# Patient Record
Sex: FEMALE | Race: WHITE | NOT HISPANIC OR LATINO | Employment: PART TIME | URBAN - METROPOLITAN AREA
[De-identification: names, ages, dates, MRNs, and addresses within clinical notes are randomized per-mention and may not be internally consistent; named-entity substitution may affect disease eponyms.]

---
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
## 2024-01-12 ENCOUNTER — OFFICE VISIT (OUTPATIENT)
Dept: URGENT CARE | Facility: CLINIC | Age: 19
End: 2024-01-12
Payer: COMMERCIAL

## 2024-01-12 VITALS
RESPIRATION RATE: 16 BRPM | SYSTOLIC BLOOD PRESSURE: 108 MMHG | TEMPERATURE: 97 F | BODY MASS INDEX: 23.04 KG/M2 | WEIGHT: 130 LBS | HEIGHT: 63 IN | DIASTOLIC BLOOD PRESSURE: 75 MMHG | OXYGEN SATURATION: 100 % | HEART RATE: 80 BPM

## 2024-01-12 DIAGNOSIS — L23.2 ALLERGIC CONTACT DERMATITIS DUE TO COSMETICS: Primary | ICD-10-CM

## 2024-01-12 PROCEDURE — 99213 OFFICE O/P EST LOW 20 MIN: CPT

## 2024-01-12 RX ORDER — DOXYCYCLINE HYCLATE 60 MG/1
TABLET, DELAYED RELEASE ORAL
COMMUNITY
Start: 2023-10-25

## 2024-01-12 RX ORDER — AMOXICILLIN 500 MG/1
500 CAPSULE ORAL 3 TIMES DAILY
COMMUNITY
Start: 2024-01-03

## 2024-01-12 NOTE — PROGRESS NOTES
[facility]        NAME: Maria Teresa Thompson is a 18 y.o. female  : 2005    MRN: [id number]  DATE: 2024  TIME: 8:30 AM    Assessment and Plan   Allergic contact dermatitis due to cosmetics [L23.2]  1. Allergic contact dermatitis due to cosmetics          Symptoms resolved at present time but patient showed pictures on cell phone and noted erythematous papules with signs of excoriation and likely related to new body wash as taking Amoxicillin > 1 week. Discontinue. Encouraged continued supportive measures.  Follow up with PCP in 3-5 days or proceed to emergency department for worsening symptoms.  Patient verbalized understanding of instructions given.       Patient Instructions     Patient Instructions   Discontinue using body wash  OTC Benadryl as needed for itching  Topical hydrocortisone cream   Follow up with PCP in 3-5 days.  Proceed to  ER if symptoms worsen.    Contact Dermatitis   AMBULATORY CARE:   Contact dermatitis  is a rash. It develops when you touch something that irritates your skin or causes an allergic reaction.  Common signs and symptoms include the following:   Red, swollen, painful rash    Skin that itches, stings, or burns    Dry, scaly, or crusty skin patches    Bumps or blisters    Fluid draining from blisters    Call your local emergency number (911 in the [place]) if:   You have sudden trouble breathing.    Your throat swells and you have trouble eating.    Your face is swollen.    Call your doctor or dermatologist if:   You have a fever.    Your blisters are draining pus.    Your rash spreads or does not get better, even after treatment.    You have questions or concerns about your condition or care.    Treatment for contact dermatitis  involves removing any irritants or allergens that cause your rash. You may also need medicines to decrease itching and swelling. They will be given as a topical medicine to apply to your rash or as a pill.  Manage contact dermatitis:    Take short baths or showers in cool water.  Use mild soap or soap-free cleansers. Add oatmeal, baking soda, or cornstarch to the bath water to help decrease skin irritation.    Avoid skin irritants.  Examples include makeup, hair products, soaps, and cleansers. Use products that do not contain a scent or dye.    Apply a cool compress to your rash.  This will help soothe your skin.    Apply lotions or creams to the area.  These help keep your skin moist and decrease itching. Apply the lotion or cream right after a lukewarm bath or shower when your skin is still damp. Use products that do not contain a scent.    Follow up with your doctor or dermatologist in 2 to 3 days:  Write down your questions so you remember to ask them during your visits.  © Copyright [employer] 2023 Information is for End User's use only and may not be sold, redistributed or otherwise used for commercial purposes.  The above information is an  only. It is not intended as medical advice for individual conditions or treatments. Talk to your doctor, nurse or pharmacist before following any medical regimen to see if it is safe and effective for you.        Chief Complaint     Chief Complaint   Patient presents with    Rash     Itchiness all over body with rash that has since faded Has been on Amoxicillin since Wednesday since she had wisdom teeth removed         History of Present Illness       18-year-old female with no significant past medical history presents with complaints of rash and pruritus x 2 days.  Patient reports using new body wash yesterday and developing rash to torso, upper extremities, and lower extremities.  She reports itching to full body including scalp and did take a dose of OTC Benadryl lotion of rash.  No shortness of breath, wheezing, fever, or URI symptoms. Patient reports currently on Amoxicillin BID for wisdom tooth extraction but taking for 9 days and no prior history of allergic reaction.         Review  "of Systems   Review of Systems   Constitutional:  Negative for chills and fever.   HENT:  Negative for congestion, ear discharge, ear pain, rhinorrhea, sore throat, trouble swallowing and voice change.    Eyes:  Negative for discharge.   Respiratory:  Negative for cough, shortness of breath and wheezing.    Cardiovascular:  Negative for chest pain.   Gastrointestinal:  Negative for abdominal pain, diarrhea, nausea and vomiting.   Musculoskeletal:  Negative for myalgias.   Skin:  Positive for rash.         Current Medications       Current Outpatient Medications:     amoxicillin (AMOXIL) 500 mg capsule, Take 500 mg by mouth 3 (three) times a day, Disp: , Rfl:     Doryx MPC 60 MG TBEC, Take one pill twice daily, Disp: , Rfl:     albuterol (ProAir HFA) 90 mcg/act inhaler, Inhale 2 puffs every 6 (six) hours as needed for wheezing, Disp: 8.5 g, Rfl: 0    triamcinolone (KENALOG) 0.1 % ointment, Apply topically 2 (two) times a day (Patient not taking: Reported on 9/19/2023), Disp: 30 g, Rfl: 1    Current Allergies     Allergies as of 01/12/2024    (No Known Allergies)            The following portions of the patient's history were reviewed and updated as appropriate: allergies, current medications, past family history, past medical history, past social history, past surgical history and problem list.     Past Medical History:   Diagnosis Date    Allergic rhinitis     Myopia of both eyes        Past Surgical History:   Procedure Laterality Date    NO PAST SURGERIES         Family History   Problem Relation Age of Onset    No Known Problems Mother     No Known Problems Father          Medications have been verified.        Objective   /75   Pulse 80   Temp (!) 97 °F (36.1 °C)   Resp 16   Ht 5' 3\" (1.6 m)   Wt 59 kg (130 lb)   LMP 01/08/2024   SpO2 100%   BMI 23.03 kg/m²   Patient's last menstrual period was 01/08/2024.       Physical Exam     Physical Exam  Vitals and nursing note reviewed.   Constitutional:  "      General: She is not in acute distress.     Appearance: She is not toxic-appearing.   HENT:      Head: Normocephalic.      Nose: Nose normal.      Mouth/Throat:      Mouth: Mucous membranes are moist.      Pharynx: Oropharynx is clear.   Eyes:      Conjunctiva/sclera: Conjunctivae normal.   Cardiovascular:      Rate and Rhythm: Normal rate and regular rhythm.      Heart sounds: Normal heart sounds.   Pulmonary:      Effort: Pulmonary effort is normal. No respiratory distress.      Breath sounds: Normal breath sounds. No stridor. No wheezing, rhonchi or rales.   Skin:     General: Skin is warm and dry.      Findings: No rash.   Neurological:      Mental Status: She is alert and oriented to person, place, and time.      Gait: Gait is intact.   Psychiatric:         Mood and Affect: Mood normal.         Behavior: Behavior normal.

## 2024-01-12 NOTE — PATIENT INSTRUCTIONS
Discontinue using body wash  OTC Benadryl as needed for itching  Topical hydrocortisone cream   Follow up with PCP in 3-5 days.  Proceed to  ER if symptoms worsen.    Contact Dermatitis   AMBULATORY CARE:   Contact dermatitis  is a rash. It develops when you touch something that irritates your skin or causes an allergic reaction.  Common signs and symptoms include the following:   Red, swollen, painful rash    Skin that itches, stings, or burns    Dry, scaly, or crusty skin patches    Bumps or blisters    Fluid draining from blisters    Call your local emergency number (911 in the [place]) if:   You have sudden trouble breathing.    Your throat swells and you have trouble eating.    Your face is swollen.    Call your doctor or dermatologist if:   You have a fever.    Your blisters are draining pus.    Your rash spreads or does not get better, even after treatment.    You have questions or concerns about your condition or care.    Treatment for contact dermatitis  involves removing any irritants or allergens that cause your rash. You may also need medicines to decrease itching and swelling. They will be given as a topical medicine to apply to your rash or as a pill.  Manage contact dermatitis:   Take short baths or showers in cool water.  Use mild soap or soap-free cleansers. Add oatmeal, baking soda, or cornstarch to the bath water to help decrease skin irritation.    Avoid skin irritants.  Examples include makeup, hair products, soaps, and cleansers. Use products that do not contain a scent or dye.    Apply a cool compress to your rash.  This will help soothe your skin.    Apply lotions or creams to the area.  These help keep your skin moist and decrease itching. Apply the lotion or cream right after a lukewarm bath or shower when your skin is still damp. Use products that do not contain a scent.    Follow up with your doctor or dermatologist in 2 to 3 days:  Write down your questions so you remember to ask them during  your visits.  © Copyright [employer] 2023 Information is for End User's use only and may not be sold, redistributed or otherwise used for commercial purposes.  The above information is an  only. It is not intended as medical advice for individual conditions or treatments. Talk to your doctor, nurse or pharmacist before following any medical regimen to see if it is safe and effective for you.

## 2024-01-28 LAB
ALBUMIN SERPL-MCNC: 4.4 G/DL (ref 3.6–5.1)
ALBUMIN/GLOB SERPL: 1.8 (CALC) (ref 1–2.5)
ALP SERPL-CCNC: 63 U/L (ref 31–125)
ALT SERPL-CCNC: 8 U/L (ref 6–29)
AST SERPL-CCNC: 14 U/L (ref 10–30)
BASOPHILS # BLD AUTO: 52 CELLS/UL (ref 0–200)
BASOPHILS NFR BLD AUTO: 1.1 %
BILIRUB SERPL-MCNC: 0.5 MG/DL (ref 0.2–1.2)
BUN SERPL-MCNC: 13 MG/DL (ref 7–25)
BUN/CREAT SERPL: 13 (CALC) (ref 6–22)
CALCIUM SERPL-MCNC: 9.6 MG/DL (ref 8.6–10.2)
CHLORIDE SERPL-SCNC: 106 MMOL/L (ref 98–110)
CO2 SERPL-SCNC: 24 MMOL/L (ref 20–32)
CREAT SERPL-MCNC: 0.97 MG/DL (ref 0.5–0.96)
EOSINOPHIL # BLD AUTO: 202 CELLS/UL (ref 15–500)
EOSINOPHIL NFR BLD AUTO: 4.3 %
ERYTHROCYTE [DISTWIDTH] IN BLOOD BY AUTOMATED COUNT: 13.4 % (ref 11–15)
GFR/BSA.PRED SERPLBLD CYS-BASED-ARV: 86 ML/MIN/1.73M2
GLOBULIN SER CALC-MCNC: 2.4 G/DL (CALC) (ref 1.9–3.7)
GLUCOSE SERPL-MCNC: 78 MG/DL (ref 65–99)
HCT VFR BLD AUTO: 37.1 % (ref 35–45)
HGB BLD-MCNC: 12 G/DL (ref 11.7–15.5)
LYMPHOCYTES # BLD AUTO: 1532 CELLS/UL (ref 850–3900)
LYMPHOCYTES NFR BLD AUTO: 32.6 %
MCH RBC QN AUTO: 26.2 PG (ref 27–33)
MCHC RBC AUTO-ENTMCNC: 32.3 G/DL (ref 32–36)
MCV RBC AUTO: 81 FL (ref 80–100)
MONOCYTES # BLD AUTO: 442 CELLS/UL (ref 200–950)
MONOCYTES NFR BLD AUTO: 9.4 %
NEUTROPHILS # BLD AUTO: 2472 CELLS/UL (ref 1500–7800)
NEUTROPHILS NFR BLD AUTO: 52.6 %
PLATELET # BLD AUTO: 277 THOUSAND/UL (ref 140–400)
PMV BLD REES-ECKER: 10.5 FL (ref 7.5–12.5)
POTASSIUM SERPL-SCNC: 4.6 MMOL/L (ref 3.5–5.3)
PROT SERPL-MCNC: 6.8 G/DL (ref 6.1–8.1)
RBC # BLD AUTO: 4.58 MILLION/UL (ref 3.8–5.1)
SODIUM SERPL-SCNC: 141 MMOL/L (ref 135–146)
WBC # BLD AUTO: 4.7 THOUSAND/UL (ref 3.8–10.8)

## 2024-03-04 ENCOUNTER — OFFICE VISIT (OUTPATIENT)
Dept: FAMILY MEDICINE CLINIC | Facility: CLINIC | Age: 19
End: 2024-03-04

## 2024-03-04 VITALS
SYSTOLIC BLOOD PRESSURE: 114 MMHG | OXYGEN SATURATION: 100 % | HEART RATE: 90 BPM | BODY MASS INDEX: 23.92 KG/M2 | WEIGHT: 135 LBS | HEIGHT: 63 IN | DIASTOLIC BLOOD PRESSURE: 68 MMHG

## 2024-03-04 DIAGNOSIS — R21 RASH: ICD-10-CM

## 2024-03-04 DIAGNOSIS — L23.9 ALLERGIC CONTACT DERMATITIS, UNSPECIFIED TRIGGER: Primary | ICD-10-CM

## 2024-03-04 PROCEDURE — 99213 OFFICE O/P EST LOW 20 MIN: CPT | Performed by: NURSE PRACTITIONER

## 2024-03-04 RX ORDER — TRIAMCINOLONE ACETONIDE 1 MG/G
OINTMENT TOPICAL 2 TIMES DAILY
Qty: 30 G | Refills: 1 | Status: SHIPPED | OUTPATIENT
Start: 2024-03-04

## 2024-03-04 NOTE — PROGRESS NOTES
[facility]        NAME: Maria Teresa Thompson is a 19 y.o. female  : 2005    MRN: [id number]  DATE: 2024  TIME: 11:03 AM    Assessment and Plan   Allergic contact dermatitis, unspecified trigger [L23.9]  1. Allergic contact dermatitis, unspecified trigger  Ambulatory Referral to Allergy      2. Rash  triamcinolone (KENALOG) 0.1 % ointment            Patient Instructions       Follow up with PCP in 3-5 days.  Proceed to  ER if symptoms worsen.    If tests have been performed at [facility], our office will contact you with results if changes need to be made to the care plan discussed with you at the visit.  You can review your full results on [facility].    Chief Complaint     Chief Complaint   Patient presents with   • Rash     Ezcema          History of Present Illness       Pt here today with concerns of pruritic rashes which occur and resolve spontaneously.  Patient reports the rash does occur spontaneously, but she notes having them during sweating and when she is around dogs.  Patient reports she was recently at a friend's house and had dog hair all over her leggings, and then started having pruritus on her legs.  Patient denies new skin care products, detergents, skin routines.      Rash  This is a new problem. The current episode started 1 to 4 weeks ago. The problem is unchanged. The problem is mild. The rash is characterized by itchiness. She was exposed to nothing. Associated symptoms include itching. Pertinent negatives include no anorexia, congestion, cough, decreased physical activity, decreased sleep, drinking less, diarrhea, facial edema, fatigue, fever, joint pain, rhinorrhea, shortness of breath, sore throat or vomiting. Past treatments include nothing. There were no sick contacts.       Review of Systems   Review of Systems   Constitutional: Negative.  Negative for fatigue and fever.   HENT: Negative.  Negative for congestion, rhinorrhea and sore throat.    Eyes:  "Negative.    Respiratory: Negative.  Negative for cough and shortness of breath.    Cardiovascular: Negative.    Gastrointestinal: Negative.  Negative for anorexia, diarrhea and vomiting.   Genitourinary: Negative.    Musculoskeletal:  Negative for joint pain.   Skin:  Positive for itching and rash.         Current Medications       Current Outpatient Medications:   •  albuterol (ProAir HFA) 90 mcg/act inhaler, Inhale 2 puffs every 6 (six) hours as needed for wheezing, Disp: 8.5 g, Rfl: 0  •  triamcinolone (KENALOG) 0.1 % ointment, Apply topically 2 (two) times a day, Disp: 30 g, Rfl: 1  •  Doryx MPC 60 MG TBEC, Take one pill twice daily, Disp: , Rfl:     Current Allergies     Allergies as of 03/04/2024   • (No Known Allergies)            The following portions of the patient's history were reviewed and updated as appropriate: allergies, current medications, past family history, past medical history, past social history, past surgical history and problem list.     Past Medical History:   Diagnosis Date   • Allergic rhinitis    • Myopia of both eyes        Past Surgical History:   Procedure Laterality Date   • NO PAST SURGERIES         Family History   Problem Relation Age of Onset   • No Known Problems Mother    • No Known Problems Father          Medications have been verified.        Objective   /68 (BP Location: Left arm, Patient Position: Sitting, Cuff Size: Standard)   Pulse 90   Ht 5' 3\" (1.6 m)   Wt 61.2 kg (135 lb)   SpO2 100%   BMI 23.91 kg/m²   No LMP recorded.       Physical Exam     Physical Exam  Vitals and nursing note reviewed.   Constitutional:       General: She is not in acute distress.     Appearance: Normal appearance.   HENT:      Head: Normocephalic and atraumatic.      Right Ear: External ear normal.      Left Ear: External ear normal.      Nose: Nose normal.   Eyes:      General:         Right eye: No discharge.         Left eye: No discharge.      Conjunctiva/sclera: Conjunctivae " normal.   Cardiovascular:      Rate and Rhythm: Normal rate and regular rhythm.      Heart sounds: Normal heart sounds. No murmur heard.  Pulmonary:      Effort: Pulmonary effort is normal.      Breath sounds: Normal breath sounds.   Abdominal:      General: Bowel sounds are normal.      Palpations: Abdomen is soft.   Musculoskeletal:         General: Normal range of motion.      Cervical back: Normal range of motion.      Right lower leg: No edema.      Left lower leg: No edema.   Lymphadenopathy:      Cervical: No cervical adenopathy.   Skin:     General: Skin is warm and dry.      Findings: No rash.   Neurological:      Mental Status: She is alert and oriented to person, place, and time.   Psychiatric:         Mood and Affect: Mood normal.         Behavior: Behavior normal.         Thought Content: Thought content normal.         Judgment: Judgment normal.

## 2024-03-04 NOTE — PATIENT INSTRUCTIONS
Contact Dermatitis   WHAT YOU NEED TO KNOW:   Contact dermatitis is a skin rash. It develops when you touch something that irritates your skin or causes an allergic reaction.  DISCHARGE INSTRUCTIONS:   Call your local emergency number (911 in the [place]) if:   You have sudden trouble breathing.    Your throat swells and you have trouble eating.    Your face is swollen.    Call your doctor or dermatologist if:   You have a fever.    Your blisters are draining pus.    Your rash spreads or does not get better, even after treatment.    You have questions or concerns about your condition or care.    Medicines:   Medicines  help decrease itching and swelling. They will be given as a topical medicine to apply to your rash or as a pill.    Take your medicine as directed.  Contact your healthcare provider if you think your medicine is not helping or if you have side effects. Tell your provider if you are allergic to any medicine. Keep a list of the medicines, vitamins, and herbs you take. Include the amounts, and when and why you take them. Bring the list or the pill bottles to follow-up visits. Carry your medicine list with you in case of an emergency.    Manage contact dermatitis:   Take short baths or showers in cool water.  Use mild soap or soap-free cleansers. Add oatmeal, baking soda, or cornstarch to the bath water to help decrease skin irritation.    Avoid skin irritants , such as makeup, hair products, soaps, and cleansers. Use products that do not contain perfume or dye.    Apply a cool compress to your rash.  This will help soothe your skin.    Apply lotions or creams to the area.  These help keep your skin moist and decrease itching. Apply the lotion or cream right after a lukewarm bath or shower when your skin is still damp. Use products that do not contain a scent.    Follow up with your doctor or dermatologist in 2 to 3 days:  Write down your questions so you remember to ask them during your visits.  © Copyright  [employer] 2023 Information is for End User's use only and may not be sold, redistributed or otherwise used for commercial purposes.  The above information is an  only. It is not intended as medical advice for individual conditions or treatments. Talk to your doctor, nurse or pharmacist before following any medical regimen to see if it is safe and effective for you.

## 2024-03-04 NOTE — ASSESSMENT & PLAN NOTE
Patient here today with concerns of pruritic rash which occurs and resolves spontaneously.  Patient denies use of skin care routine, or sick contacts.  Patient does have pictures of rash from when it occurs, and the area is raised in linear patterns and red.  She reports episodes after being around dogs, sweating, and occasionally in shower.  She has tried to make an appointment with dermatology, however reports a long wait to get into dermatology.  Based on pictures and patient's symptoms, believe this is likely an allergic dermatitis.  Will send to allergist for testing at this time.  Refill of triamcinolone which patient is allowed to use as needed for pruritic rashes.  Advised to avoid use on face.  Advised on adding Zyrtec daily, to help with allergies.  Patient verbalized understanding and is in agreement with plan.

## 2024-03-04 NOTE — PROGRESS NOTES
Assessment/Plan:    Allergic contact dermatitis  Patient here today with concerns of pruritic rash which occurs and resolves spontaneously.  Patient denies use of skin care routine, or sick contacts.  Patient does have pictures of rash from when it occurs, and the area is raised in linear patterns and red.  She reports episodes after being around dogs, sweating, and occasionally in shower.  She has tried to make an appointment with dermatology, however reports a long wait to get into dermatology.  Based on pictures and patient's symptoms, believe this is likely an allergic dermatitis.  Will send to allergist for testing at this time.  Refill of triamcinolone which patient is allowed to use as needed for pruritic rashes.  Advised to avoid use on face.  Advised on adding Zyrtec daily, to help with allergies.  Patient verbalized understanding and is in agreement with plan.     Diagnoses and all orders for this visit:    Allergic contact dermatitis, unspecified trigger  -     Ambulatory Referral to Allergy; Future    Rash  -     triamcinolone (KENALOG) 0.1 % ointment; Apply topically 2 (two) times a day          Subjective:      Patient ID: Maria Teresa Thompson is a 19 y.o. female.    Pt here today with concerns of pruritic rashes which occur and resolve spontaneously.  Patient reports the rash does occur spontaneously, but she notes having them during sweating and when she is around dogs.  Patient reports she was recently at a friend's house and had dog hair all over her leggings, and then started having pruritus on her legs.  Patient denies new skin care products, detergents, skin routines.        Rash  This is a new problem. The current episode started in the past 7 days. The problem is unchanged. The rash is characterized by itchiness. She was exposed to an animal (Dogs, sweating, showering). Associated symptoms include itching. Pertinent negatives include no anorexia, congestion, cough, decreased physical activity,  "decreased responsiveness, decreased sleep, drinking less, diarrhea, fatigue, fever, joint pain, shortness of breath, sore throat or vomiting. Past treatments include nothing. There were no sick contacts.       The following portions of the patient's history were reviewed and updated as appropriate: allergies, current medications, past family history, past medical history, past social history, past surgical history, and problem list.    Review of Systems   Constitutional: Negative.  Negative for decreased responsiveness, fatigue and fever.   HENT:  Negative for congestion and sore throat.    Eyes: Negative.  Negative for pain, discharge, redness and itching.   Respiratory: Negative.  Negative for cough and shortness of breath.    Cardiovascular: Negative.    Gastrointestinal: Negative.  Negative for anorexia, diarrhea and vomiting.   Musculoskeletal: Negative.  Negative for joint pain.   Skin:  Positive for itching and rash.   Neurological: Negative.          Objective:      /68 (BP Location: Left arm, Patient Position: Sitting, Cuff Size: Standard)   Pulse 90   Ht 5' 3\" (1.6 m)   Wt 61.2 kg (135 lb)   SpO2 100%   BMI 23.91 kg/m²          Physical Exam  Vitals and nursing note reviewed.   Constitutional:       General: She is not in acute distress.     Appearance: Normal appearance.   HENT:      Head: Normocephalic and atraumatic.      Right Ear: External ear normal.      Left Ear: External ear normal.      Nose: Nose normal.   Eyes:      General:         Right eye: No discharge.         Left eye: No discharge.      Conjunctiva/sclera: Conjunctivae normal.   Cardiovascular:      Rate and Rhythm: Normal rate and regular rhythm.      Heart sounds: Normal heart sounds. No murmur heard.  Pulmonary:      Effort: Pulmonary effort is normal.      Breath sounds: Normal breath sounds.   Musculoskeletal:         General: Normal range of motion.      Cervical back: Normal range of motion.      Right lower leg: No " edema.      Left lower leg: No edema.   Lymphadenopathy:      Cervical: No cervical adenopathy.   Skin:     General: Skin is warm and dry.   Neurological:      Mental Status: She is alert and oriented to person, place, and time.   Psychiatric:         Mood and Affect: Mood normal.         Behavior: Behavior normal.         Thought Content: Thought content normal.         Judgment: Judgment normal.

## 2024-05-03 ENCOUNTER — CLINICAL SUPPORT (OUTPATIENT)
Dept: FAMILY MEDICINE CLINIC | Facility: CLINIC | Age: 19
End: 2024-05-03

## 2024-05-03 DIAGNOSIS — Z23 ENCOUNTER FOR IMMUNIZATION: Primary | ICD-10-CM

## 2024-05-03 PROCEDURE — 86580 TB INTRADERMAL TEST: CPT

## 2024-05-03 NOTE — PROGRESS NOTES
PPD Placement note  Maria Teresa Thompson, 19 y.o. female is here today for placement of PPD test  Reason for PPD test: work/school  Pt taken PPD test before: yes  Verified in allergy area and with patient that they are not allergic to the products PPD is made of (Phenol or Tween). Yes  Is patient taking any oral or IV steroid medication now or have they taken it in the last month? no  Has the patient ever received the BCG vaccine?: no  Has the patient been in recent contact with anyone known or suspected of having active TB disease?: no       Date of exposure (if applicable): na       Name of person they were exposed to (if applicable): na  Patient's Country of origin?: na  O: Alert and oriented in NAD.  P:  PPD placed on 5/3/2024.  Patient advised to return for reading within 48-72 hours.

## 2024-05-06 ENCOUNTER — CLINICAL SUPPORT (OUTPATIENT)
Dept: FAMILY MEDICINE CLINIC | Facility: CLINIC | Age: 19
End: 2024-05-06

## 2024-05-06 DIAGNOSIS — Z00.00 ANNUAL PHYSICAL EXAM: Primary | ICD-10-CM

## 2024-05-06 DIAGNOSIS — Z23 ENCOUNTER FOR IMMUNIZATION: ICD-10-CM

## 2024-05-06 LAB
INDURATION: 0 MM
TB SKIN TEST: NEGATIVE

## 2024-05-06 NOTE — PROGRESS NOTES
PPD Reading Note  PPD read and results entered in [facility].  Result: 0 mm induration.  Interpretation: negative   If test not read within 48-72 hours of initial placement, patient advised to repeat in other arm 1-3 weeks after this test.  Allergic reaction: no

## 2024-07-30 ENCOUNTER — TELEPHONE (OUTPATIENT)
Age: 19
End: 2024-07-30

## 2024-07-30 NOTE — TELEPHONE ENCOUNTER
The writer attempted to contact the patient off the [facility] wait list to offer a potential appt. The writer LVM for the patient to contact the intake department for assistance with scheduling.

## 2024-08-06 NOTE — TELEPHONE ENCOUNTER
The writer attempted to contact the patient off the [facility] wait list to offer a potential appt. The writer LVM for the patient to contact the intake department for assistance with scheduling.     2nd attempt   Pt removed off the wait list

## 2025-07-29 ENCOUNTER — TELEPHONE (OUTPATIENT)
Dept: FAMILY MEDICINE CLINIC | Facility: CLINIC | Age: 20
End: 2025-07-29